# Patient Record
Sex: MALE | Race: WHITE | NOT HISPANIC OR LATINO | Employment: OTHER | URBAN - METROPOLITAN AREA
[De-identification: names, ages, dates, MRNs, and addresses within clinical notes are randomized per-mention and may not be internally consistent; named-entity substitution may affect disease eponyms.]

---

## 2018-05-14 ENCOUNTER — EVALUATION (OUTPATIENT)
Dept: PHYSICAL THERAPY | Facility: CLINIC | Age: 73
End: 2018-05-14
Payer: MEDICARE

## 2018-05-14 DIAGNOSIS — M54.17 RADICULOPATHY, LUMBOSACRAL REGION: Primary | ICD-10-CM

## 2018-05-14 PROCEDURE — G8982 BODY POS GOAL STATUS: HCPCS | Performed by: PHYSICAL THERAPIST

## 2018-05-14 PROCEDURE — 97535 SELF CARE MNGMENT TRAINING: CPT | Performed by: PHYSICAL THERAPIST

## 2018-05-14 PROCEDURE — 97163 PT EVAL HIGH COMPLEX 45 MIN: CPT | Performed by: PHYSICAL THERAPIST

## 2018-05-14 PROCEDURE — G8981 BODY POS CURRENT STATUS: HCPCS | Performed by: PHYSICAL THERAPIST

## 2018-05-14 NOTE — PROGRESS NOTES
PT Evaluation     Today's date: 2018  Patient name: Brent Maurer  : 1945  MRN: [id number]  Referring provider: Maximino Bray MD  Dx:   Encounter Diagnosis     ICD-10-CM    1  Radiculopathy, lumbosacral region M54 17                   Assessment  Impairments: abnormal gait, abnormal or restricted ROM, activity intolerance, impaired balance, impaired physical strength, lacks appropriate home exercise program and pain with function    Assessment details: Pt presents with chief complaint of pain and activity limitation in the upper and lower back with spinal fusions from the mid thoracic to lower lumbar  PT notes the pt demonstrates decreased BLE mm strength and ROM, decreased core strength, decreased L/S ROM, decreased activity tolerance, and decreased functional mobility  Pt would benefit from skilled PT to improve strength, pain free ROM, core stability, and functional mobility  Understanding of Dx/Px/POC: good   Prognosis: good    Goals  STG  1  Pt will decrease pain by 25-50% in 4 wks  2  Pt will improve BLE ROM by 25-50% in 4 wks  3  Pt will demonstrate improved postural control in 4 wks    LTG  1  Pt will decrease pain by % in 8 wks   2  Pt will improve BLE and core mm strength to 5/5 in 8 wks  3   Pt will be able to tolerate walking 0 5-1 0 miles in 8 wks       Plan  Patient would benefit from: skilled PT  Planned modality interventions: cryotherapy, unattended electrical stimulation and thermotherapy: hydrocollator packs  Planned therapy interventions: abdominal trunk stabilization, flexibility, graded exercise, home exercise program, manual therapy, joint mobilization, patient education, postural training, strengthening, stretching and therapeutic exercise  Frequency: 3x week  Duration in visits: 12  Duration in weeks: 4  Treatment plan discussed with: patient  Plan details: Discussed findings of POC with pt, pt agreeable to skilled PT 2-3x/wk for 4 wks        Subjective Evaluation    History of Present Illness  Mechanism of injury: Pt presents with chief complaint of pain in the mid thoracic to cervical region and the lower lumbar and hip region  Pt reports a hx of 2 spinal fusions, the first occurring approximately 40 years ago fusing L2-L4  Most recent fusion performed 17, which took out original fusion ad re-fused from mid thoracic to lower lumbar  Pt reports he has pain that can become very severe above and below the fusion, in the right hip, and into the right calf  Pt recently started pain management to help control pain  Pt notes hx of 4 stents placed 2016 and nocturnal epileptic seizures well controlled by medication  Quality of life: good    Pain  Current pain ratin  At best pain ratin  At worst pain rating: 10  Location: Cervical and Low Back  Quality: dull ache and sharp  Relieving factors: rest and medications  Aggravating factors: walking and standing  Progression: worsening    Treatments  Previous treatment: physical therapy  Patient Goals  Patient goals for therapy: decreased pain, increased motion and increased strength          Objective     Special Questions  Negative for bladder dysfunction, bowel dysfunction and saddle (S4) numbness    Postural Observations  Seated posture: fair  Standing posture: fair        Palpation   Left   Tenderness of the erector spinae and lumbar paraspinals  Right Tenderness of the erector spinae and lumbar paraspinals       Neurological Testing     Reflexes   Left   Patellar (L4): normal (2+)  Achilles (S1): normal (2+)    Right   Patellar (L4): normal (2+)  Achilles (S1): normal (2+)    Active Range of Motion     Lumbar   Flexion: 40 degrees   Extension: 20 degrees   Left lateral flexion: 20 degrees with pain  Right lateral flexion: 15 degrees with pain  Left rotation: 15 degrees with pain  Right rotation: 15 degrees with pain    Strength/Myotome Testing     Left Hip   Planes of Motion   Flexion: 3+  Abduction: 4  Adduction: 4    Right Hip   Planes of Motion   Flexion: 3+  Abduction: 4  Adduction: 4    Left Knee   Flexion: 4+  Extension: 4+    Right Knee   Flexion: 4+  Extension: 4+    Left Ankle/Foot   Dorsiflexion: 4  Plantar flexion: 4    Right Ankle/Foot   Dorsiflexion: 4  Plantar flexion: 4    Additional Strength Details  Pain in low back noted with resisted hip flexion bilaterally     Tests       Thoracic   Negative slump  Lumbar   Positive repeated flexion  Negative repeated extension and slump  Left   Positive passive SLR  Negative quadrant and vertical compression  Right   Positive passive SLR  Negative quadrant and vertical compression  Left Pelvic Girdle/Sacrum   Negative: sacrum compression and gapping  Right Pelvic Girdle/Sacrum   Negative: sacrum compression and gapping  Left Hip   Negative VIVIAN, SI compression, SI distraction and sign of the buttock  Right Hip   Positive VIVIAN  Negative SI compression, SI distraction and sign of the buttock         Flowsheet Rows      Most Recent Value   PT/OT G-Codes   Current Score  32   Projected Score  42   FOTO information reviewed  Yes   Assessment Type  Evaluation   G code set  Changing & Maintaining Body Position   Changing and Maintaining Body Position Current Status ()  CL   Changing and Maintaining Body Position Goal Status ()  CK        Precautions: Back Pain    Specialty Daily Treatment Diary     Manual  5/14/18       BLE PROM                                            Exercise Diary  5/14/18       NuStep        MTP/LTP        TR/HR        Mini Squats        Standing SLR 3-way        Fwd/Lat Step Ups        Supine Hip Abd        Supine Hip Add        Bridges        LTR        HS Stretch        Lat Pulldown        Seated Row        Leg Press        Shoulder Press                                                    Modalities 5/14/18       MHP to Low Back 15 min

## 2018-05-14 NOTE — LETTER
May 30, 2018    Jose Velez MD  [address]  [facility] [place] [address]    Patient: Parisa Avalos   YOB: 1945   Date of Visit: 2018     Encounter Diagnosis     ICD-10-CM    1  Radiculopathy, lumbosacral region M54 17        Dear Dr Florinda Adam:    Please review the attached Plan of Care from Park Castano's recent visit  Please verify that you agree therapy should continue by signing the attached document and sending it back to our office  If you have any questions or concerns, please don't hesitate to call  Sincerely,    Karen Pagan, PT      Referring Provider:      I certify that I have read the below Plan of Care and certify the need for these services furnished under this plan of treatment while under my care  Jose Velez MD  [address]  [facility] [place] [id number]  VIA Facsimile: [phone]          PT Evaluation     Today's date: 2018  Patient name: Parisa Avalos  : 1945  MRN: [id number]  Referring provider: Sunita Berry MD  Dx:   Encounter Diagnosis     ICD-10-CM    1  Radiculopathy, lumbosacral region M54 17                   Assessment  Impairments: abnormal gait, abnormal or restricted ROM, activity intolerance, impaired balance, impaired physical strength, lacks appropriate home exercise program and pain with function    Assessment details: Pt presents with chief complaint of pain and activity limitation in the upper and lower back with spinal fusions from the mid thoracic to lower lumbar  PT notes the pt demonstrates decreased BLE mm strength and ROM, decreased core strength, decreased L/S ROM, decreased activity tolerance, and decreased functional mobility  Pt would benefit from skilled PT to improve strength, pain free ROM, core stability, and functional mobility  Understanding of Dx/Px/POC: good   Prognosis: good    Goals  STG  1  Pt will decrease pain by 25-50% in 4 wks  2   Pt will improve BLE ROM by 25-50% in 4 wks  3  Pt will demonstrate improved postural control in 4 wks    LTG  1  Pt will decrease pain by % in 8 wks   2  Pt will improve BLE and core mm strength to 5/5 in 8 wks  3  Pt will be able to tolerate walking 0 5-1 0 miles in 8 wks       Plan  Patient would benefit from: skilled PT  Planned modality interventions: cryotherapy, unattended electrical stimulation and thermotherapy: hydrocollator packs  Planned therapy interventions: abdominal trunk stabilization, flexibility, graded exercise, home exercise program, manual therapy, joint mobilization, patient education, postural training, strengthening, stretching and therapeutic exercise  Frequency: 3x week  Duration in visits: 12  Duration in weeks: 4  Treatment plan discussed with: patient  Plan details: Discussed findings of POC with pt, pt agreeable to skilled PT 2-3x/wk for 4 wks        Subjective Evaluation    History of Present Illness  Mechanism of injury: Pt presents with chief complaint of pain in the mid thoracic to cervical region and the lower lumbar and hip region  Pt reports a hx of 2 spinal fusions, the first occurring approximately 40 years ago fusing L2-L4  Most recent fusion performed 17, which took out original fusion ad re-fused from mid thoracic to lower lumbar  Pt reports he has pain that can become very severe above and below the fusion, in the right hip, and into the right calf  Pt recently started pain management to help control pain  Pt notes hx of 4 stents placed 2016 and nocturnal epileptic seizures well controlled by medication    Quality of life: good    Pain  Current pain ratin  At best pain ratin  At worst pain rating: 10  Location: Cervical and Low Back  Quality: dull ache and sharp  Relieving factors: rest and medications  Aggravating factors: walking and standing  Progression: worsening    Treatments  Previous treatment: physical therapy  Patient Goals  Patient goals for therapy: decreased pain, increased motion and increased strength          Objective     Special Questions  Negative for bladder dysfunction, bowel dysfunction and saddle (S4) numbness    Postural Observations  Seated posture: fair  Standing posture: fair        Palpation   Left   Tenderness of the erector spinae and lumbar paraspinals  Right Tenderness of the erector spinae and lumbar paraspinals  Neurological Testing     Reflexes   Left   Patellar (L4): normal (2+)  Achilles (S1): normal (2+)    Right   Patellar (L4): normal (2+)  Achilles (S1): normal (2+)    Active Range of Motion     Lumbar   Flexion: 40 degrees   Extension: 20 degrees   Left lateral flexion: 20 degrees with pain  Right lateral flexion: 15 degrees with pain  Left rotation: 15 degrees with pain  Right rotation: 15 degrees with pain    Strength/Myotome Testing     Left Hip   Planes of Motion   Flexion: 3+  Abduction: 4  Adduction: 4    Right Hip   Planes of Motion   Flexion: 3+  Abduction: 4  Adduction: 4    Left Knee   Flexion: 4+  Extension: 4+    Right Knee   Flexion: 4+  Extension: 4+    Left Ankle/Foot   Dorsiflexion: 4  Plantar flexion: 4    Right Ankle/Foot   Dorsiflexion: 4  Plantar flexion: 4    Additional Strength Details  Pain in low back noted with resisted hip flexion bilaterally     Tests       Thoracic   Negative slump  Lumbar   Positive repeated flexion  Negative repeated extension and slump  Left   Positive passive SLR  Negative quadrant and vertical compression  Right   Positive passive SLR  Negative quadrant and vertical compression  Left Pelvic Girdle/Sacrum   Negative: sacrum compression and gapping  Right Pelvic Girdle/Sacrum   Negative: sacrum compression and gapping  Left Hip   Negative VIVIAN, SI compression, SI distraction and sign of the buttock  Right Hip   Positive VIVIAN  Negative SI compression, SI distraction and sign of the buttock         Flowsheet Rows      Most Recent Value   PT/OT G-Codes Current Score  32   Projected Score  42   FOTO information reviewed  Yes   Assessment Type  Evaluation   G code set  Changing & Maintaining Body Position   Changing and Maintaining Body Position Current Status ()  CL   Changing and Maintaining Body Position Goal Status ()  CK        Precautions: Back Pain    Specialty Daily Treatment Diary     Manual  5/14/18       BLE PROM                                            Exercise Diary  5/14/18       NuStep        MTP/LTP        TR/HR        Mini Squats        Standing SLR 3-way        Fwd/Lat Step Ups        Supine Hip Abd        Supine Hip Add        Bridges        LTR        HS Stretch        Lat Pulldown        Seated Row        Leg Press        Shoulder Press                                                    Modalities 5/14/18       MHP to Low Back 15 min

## 2018-05-15 ENCOUNTER — OFFICE VISIT (OUTPATIENT)
Dept: PHYSICAL THERAPY | Facility: CLINIC | Age: 73
End: 2018-05-15
Payer: MEDICARE

## 2018-05-15 DIAGNOSIS — M54.17 RADICULOPATHY, LUMBOSACRAL REGION: Primary | ICD-10-CM

## 2018-05-15 PROCEDURE — 97140 MANUAL THERAPY 1/> REGIONS: CPT

## 2018-05-15 PROCEDURE — 97110 THERAPEUTIC EXERCISES: CPT

## 2018-05-15 NOTE — PROGRESS NOTES
Daily Note     Today's date: 5/15/2018  Patient name: Gillian Chow  : 1945  MRN: [id number]  Referring provider: Janie Garcia MD  Dx: No diagnosis found  Subjective: I am only able to walk about 30 steps 2* pain at back and R leg and because of muscle weakness  Objective: See treatment diary below    Assessment: Tolerated treatment poor  Patient requires freq rests and vc's for correct ex, posture and for lumbar stability  Pt with pain and fatigue with minimal ex  He does like the NuStep  Extreme limitations noted with B calf/HS during ex and MT  Plan: Continue per plan of care     Precautions: Back Pain     Specialty Daily Treatment Diary      Manual  5/14/18  5-15-18         BLE PROM    10'               Exercise Diary  5/14/18  5-15-18         NuStep    L 1  8'         MTP/LTP   Green 1 x 10         TR/HR    1 x 20         Mini Squats    1 x 5         Standing SLR 3-way   Flex only x 5 Aki         Fwd/Lat Step Ups             Supine Hip Abd   Ball x 10         Supine Hip Add   Green x 10         PPT  5" x 10              Bridges             LTR    1 x 10          HS Stretch             Lat Pulldown             Seated Row             Leg Press             Shoulder Press                                                                                         Modalities 5/14/18  5-15-18         MHP to Low Back 15 min  15'

## 2018-05-17 ENCOUNTER — OFFICE VISIT (OUTPATIENT)
Dept: PHYSICAL THERAPY | Facility: CLINIC | Age: 73
End: 2018-05-17
Payer: MEDICARE

## 2018-05-17 DIAGNOSIS — M54.17 RADICULOPATHY, LUMBOSACRAL REGION: Primary | ICD-10-CM

## 2018-05-17 PROCEDURE — 97110 THERAPEUTIC EXERCISES: CPT

## 2018-05-17 PROCEDURE — 97150 GROUP THERAPEUTIC PROCEDURES: CPT

## 2018-05-17 NOTE — PROGRESS NOTES
Daily Note     Today's date: 2018  Patient name: Sangita Moralez  : 1945  MRN: [id number]  Referring provider: Abraham Sue MD  Dx:   Encounter Diagnosis     ICD-10-CM    1  Radiculopathy, lumbosacral region M54 17                   Subjective:  Standing and walking really get the back sore      Objective: See treatment diary below      Assessment: Tolerated treatment well  Tx modified per pt request for more strengthening exercises  Did well with TE  Avoided standing exercises due to reports of increased pain with this  Patient would benefit from continued PT      Plan: Continue per plan of care         Precautions: Back Pain     Specialty Daily Treatment Diary      Manual  5/14/18  5-15-18  5-17-18       BLE PROM    10'               Exercise Diary  5/14/18  5-15-18  5-17-18       NuStep    L 1  8'  L3  10'       MTP/LTP   Green 1 x 10         TR/HR    1 x 20         Mini Squats    1 x 5         Standing SLR 3-way   Flex only x 5 Aki         UBE (retro)     10'         Supine Hip Abd   Ball x 10         Supine Hip Add   Green x 10         PPT  5" x 10 20x  5"     abd crunch   20x      Bridges      30x       LTR    1 x 10          HS Stretch             Lat Pulldown      30#  30x       Seated Row             Leg Press             Shoulder Press                                                                                         Modalities 5/14/18  5-15-18  5-17-18       MHP to Low Back 15 min  15'  10'

## 2018-05-22 ENCOUNTER — APPOINTMENT (OUTPATIENT)
Dept: PHYSICAL THERAPY | Facility: CLINIC | Age: 73
End: 2018-05-22
Payer: MEDICARE

## 2018-05-24 ENCOUNTER — APPOINTMENT (OUTPATIENT)
Dept: PHYSICAL THERAPY | Facility: CLINIC | Age: 73
End: 2018-05-24
Payer: MEDICARE

## 2018-06-04 ENCOUNTER — TRANSCRIBE ORDERS (OUTPATIENT)
Dept: PHYSICAL THERAPY | Facility: CLINIC | Age: 73
End: 2018-06-04

## 2018-06-04 DIAGNOSIS — M54.17 RADICULOPATHY, LUMBOSACRAL REGION: Primary | ICD-10-CM

## 2018-06-21 NOTE — PROGRESS NOTES
Daily Note     Today's date: 2018  Patient name: Quintin Moore  : 1945  MRN: [id number]  Referring provider: Zoë Savage MD  Dx:   Encounter Diagnosis     ICD-10-CM    1  Radiculopathy, lumbosacral region M54 17        Start Time: 5445  Stop Time: 1525  Total time in clinic (min): 70 minutes    Subjective:  Standing and walking really get the back sore      Objective: See treatment diary below      Assessment: Tolerated treatment well  Tx modified per pt request for more strengthening exercises  Did well with TE  Avoided standing exercises due to reports of increased pain with this  Patient would benefit from continued PT      Plan: Continue per plan of care  Precautions: Back Pain     Specialty Daily Treatment Diary      Manual  5/14/18  5-15-18  5-17-18       BLE PROM    10'               Exercise Diary  5/14/18  5-15-18  5-17-18       NuStep    L 1  8'  L3  10'       MTP/LTP   Green 1 x 10         TR/HR    1 x 20         Mini Squats    1 x 5         Standing SLR 3-way   Flex only x 5 Aki         UBE (retro)     10'         Supine Hip Abd   Ball x 10         Supine Hip Add   Green x 10         PPT  5" x 10 20x  5"     abd crunch   20x      Bridges      30x       LTR    1 x 10          HS Stretch             Lat Pulldown      30#  30x       Seated Row             Leg Press             Shoulder Press                                                                                         Modalities 5/14/18  5-15-18  5-17-18       MHP to Low Back 15 min  15'  10'                 Pt was last seen for skilled PT on 18 and did not schedule any additional appointments  Pt has not contacted clinic for additional PT services  Pt will be d/c from skilled PT at this time 2* expiration of PT script

## 2018-09-24 ENCOUNTER — EVALUATION (OUTPATIENT)
Dept: PHYSICAL THERAPY | Facility: CLINIC | Age: 73
End: 2018-09-24
Payer: MEDICARE

## 2018-09-24 DIAGNOSIS — Z98.1 S/P SPINAL FUSION: ICD-10-CM

## 2018-09-24 DIAGNOSIS — M54.50 CHRONIC BILATERAL LOW BACK PAIN WITHOUT SCIATICA: Primary | ICD-10-CM

## 2018-09-24 DIAGNOSIS — G89.29 CHRONIC BILATERAL LOW BACK PAIN WITHOUT SCIATICA: Primary | ICD-10-CM

## 2018-09-24 PROCEDURE — 97140 MANUAL THERAPY 1/> REGIONS: CPT | Performed by: PHYSICAL THERAPIST

## 2018-09-24 PROCEDURE — G8991 OTHER PT/OT GOAL STATUS: HCPCS | Performed by: PHYSICAL THERAPIST

## 2018-09-24 PROCEDURE — 97163 PT EVAL HIGH COMPLEX 45 MIN: CPT | Performed by: PHYSICAL THERAPIST

## 2018-09-24 PROCEDURE — G8990 OTHER PT/OT CURRENT STATUS: HCPCS | Performed by: PHYSICAL THERAPIST

## 2018-09-24 NOTE — PROGRESS NOTES
PT Evaluation     Today's date: 2018  Patient name: Mark Mittal  : 1945  MRN: [id number]  Referring provider: Parminder Andre MD  Dx:   Encounter Diagnosis     ICD-10-CM    1  Chronic bilateral low back pain without sciatica M54 5     G89 29    2  S/P spinal fusion Z98 1                   Assessment  Impairments: abnormal or restricted ROM, activity intolerance, impaired physical strength and pain with function  Functional limitations: Difficulty with walking, kneeling, stair negotiation, picking up low lying objects  Assessment details: Mark Mittal is a 68 y o  male who presents to outpatient PT with a  Chronic bilateral low back pain without sciatica  (primary encounter diagnosis)  S/P spinal fusion  No further referral appears necessary at this time based upon examination results  Pt presents with decreased strength, ROM, balance, functional activity tolerance, and pain with movement in his lumbar spine, which is  limiting his ability to perform the aforementioned functional activities  Etiologic factors include repetitive poor body mechanics  Prognosis is good given HEP compliance and PT 2-3/wk  Please contact me if you have any questions or recommendations  Thank you for the opportunity to share in  [address] [place] [address]  Understanding of Dx/Px/POC: good   Prognosis: good    Goals  1  In 4-6 weeks, patient will demonstrate a decrease in pain to 2/10 during functional activities  2  In 4-6 weeks, patient will demonstrate an increase in range of motion by 10 degrees  3  In 4-6 weeks, patient will demonstrate an increase in strength by 1/2 grade on MMT    1  In 6-8 weeks, patient will be independent with their home exercise program  2  In 6-8 weeks, patient will have zero limitations with strength  3  In 6-8 weeks, patient will have zero limitations with ROM  4  In 6-8 weeks, patient will have zero limitations with ambulation  5    In 6-8 weeks, patient will have zero limitations with stair negotiation    Plan  Patient would benefit from: skilled physical therapy  Planned therapy interventions: manual therapy, therapeutic exercise and home exercise program  Frequency: 3x week  Duration in weeks: 4  Treatment plan discussed with: patient        Subjective Evaluation    History of Present Illness  Date of surgery: 2017  Mechanism of injury: The patient presents to outpatient physical therapy, with a chief complaint of low back pain  He reports that he had a triple fusion in his back, They went in in 2017, and revised the Surgery  He reports that he has 5 fused vertebrae, 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  He is unable to walk for long periods of time, and has trouble negotiating stairs  He is hoping to improve his core strength  Pain  Current pain ratin  At best pain ratin  At worst pain rating: 10  Quality: tight  Relieving factors: change in position  Aggravating factors: walking and stair climbing  Progression: worsening      Diagnostic Tests  X-ray: abnormal  MRI studies: abnormal  Treatments  Previous treatment: physical therapy  Current treatment: physical therapy  Patient Goals  Patient goals for therapy: increased motion, increased strength and decreased pain  Patient goal: Get his life back, Walk a mile, return to biking  Objective     Active Range of Motion     Lumbar   Flexion: 59 degrees   Extension: 10 degrees   Left lateral flexion: 75 degrees   Right lateral flexion: 75 degrees   Left Hip   Flexion: WFL  Abduction: WFL  Adduction: WFL    Right Hip   Flexion: WFL  Abduction: WFL  Adduction: WFL    Additional Active Range of Motion Details  Seated SLR  - Negative Bilaterally       SLR - Negative Bilaterally      90/90 -  Right - 50    Left  - 60    Tightness in Bilateral Piriformis,   Bilateral Hip PROM is WFL             Strength/Myotome Testing     Left Hip   Planes of Motion   Flexion: 3+  Abduction: 3+ and WFL  Adduction: 3+    Right Hip   Planes of Motion   Flexion: 3+  Abduction: 3+  Adduction: 3+          Precautions: Spinal fusion with Titanium implants, avoid Traction       Daily Treatment Diary     Manual  9/24            B HS              B Piriformis                                         15min                Exercise Diary              PPT             Hip Add with Ball             Clamshells              SLR             Supine Marching              Pball ABS              Step Ups              Mini Squat              SLS              HR/TR             Nu Step              T-Band anti rotation              Tband lift chop              Seated Row              Lat pulldown              Leg extension              Leg Curl              Leg Press                                            Modalities

## 2018-09-24 NOTE — LETTER
2018    Randa Willis MD  [address] [facility] [address]    Patient: Mark Mittal   YOB: 1945   Date of Visit: 2018     Encounter Diagnosis     ICD-10-CM    1  Chronic bilateral low back pain without sciatica M54 5     G89 29    2  S/P spinal fusion Z98 1        Dear Dr Wilhelm Phoenix:    Please review the attached Plan of Care from Terrance Castano's recent visit  Please verify that you agree therapy should continue by signing the attached document and sending it back to our office  If you have any questions or concerns, please don't hesitate to call  Sincerely,    Liliam Hamilton PT      Referring Provider:      I certify that I have read the below Plan of Care and certify the need for these services furnished under this plan of treatment while under my care  Randa Willis MD  [address] [facility] [address]: [phone]          PT Evaluation     Today's date: 2018  Patient name: Mark Mittal  : 1945  MRN: [id number]  Referring provider: Parminder Andre MD  Dx:   Encounter Diagnosis     ICD-10-CM    1  Chronic bilateral low back pain without sciatica M54 5     G89 29    2  S/P spinal fusion Z98 1                   Assessment  Impairments: abnormal or restricted ROM, activity intolerance, impaired physical strength and pain with function  Functional limitations: Difficulty with walking, kneeling, stair negotiation, picking up low lying objects  Assessment details: Mark Mittal is a 68 y o  male who presents to outpatient PT with a  Chronic bilateral low back pain without sciatica  (primary encounter diagnosis)  S/P spinal fusion  No further referral appears necessary at this time based upon examination results   Pt presents with decreased strength, ROM, balance, functional activity tolerance, and pain with movement in his lumbar spine, which is  limiting his ability to perform the aforementioned functional activities  Etiologic factors include repetitive poor body mechanics  Prognosis is good given HEP compliance and PT 2-3/wk  Please contact me if you have any questions or recommendations  Thank you for the opportunity to share in  [facility]  Understanding of Dx/Px/POC: good   Prognosis: good    Goals  1  In 4-6 weeks, patient will demonstrate a decrease in pain to 2/10 during functional activities  2  In 4-6 weeks, patient will demonstrate an increase in range of motion by 10 degrees  3  In 4-6 weeks, patient will demonstrate an increase in strength by 1/2 grade on MMT    1  In 6-8 weeks, patient will be independent with their home exercise program  2  In 6-8 weeks, patient will have zero limitations with strength  3  In 6-8 weeks, patient will have zero limitations with ROM  4  In 6-8 weeks, patient will have zero limitations with ambulation  5  In 6-8 weeks, patient will have zero limitations with stair negotiation    Plan  Patient would benefit from: skilled physical therapy  Planned therapy interventions: manual therapy, therapeutic exercise and home exercise program  Frequency: 3x week  Duration in weeks: 4  Treatment plan discussed with: patient        Subjective Evaluation    History of Present Illness  Date of surgery: 2017  Mechanism of injury: The patient presents to outpatient physical therapy, with a chief complaint of low back pain  He reports that he had a triple fusion in his back, They went in in 2017, and revised the Surgery  He reports that he has 5 fused vertebrae, 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  He is unable to walk for long periods of time, and has trouble negotiating stairs  He is hoping to improve his core strength     Pain  Current pain ratin  At best pain ratin  At worst pain rating: 10  Quality: tight  Relieving factors: change in position  Aggravating factors: walking and stair climbing  Progression: worsening      Diagnostic Tests  X-ray: abnormal  MRI studies: abnormal  Treatments  Previous treatment: physical therapy  Current treatment: physical therapy  Patient Goals  Patient goals for therapy: increased motion, increased strength and decreased pain  Patient goal: Get his life back, Walk a mile, return to biking  Objective     Active Range of Motion     Lumbar   Flexion: 59 degrees   Extension: 10 degrees   Left lateral flexion: 75 degrees   Right lateral flexion: 75 degrees   Left Hip   Flexion: WFL  Abduction: WFL  Adduction: WFL    Right Hip   Flexion: WFL  Abduction: WFL  Adduction: WFL    Additional Active Range of Motion Details  Seated SLR  - Negative Bilaterally       SLR - Negative Bilaterally      90/90 -  Right - 50    Left  - 60    Tightness in Bilateral Piriformis,   Bilateral Hip PROM is WFL  Strength/Myotome Testing     Left Hip   Planes of Motion   Flexion: 3+  Abduction: 3+ and WFL  Adduction: 3+    Right Hip   Planes of Motion   Flexion: 3+  Abduction: 3+  Adduction: 3+          Precautions: Spinal fusion with Titanium implants, avoid Traction       Daily Treatment Diary     Manual  9/24            B HS              B Piriformis                                         15min                Exercise Diary              PPT             Hip Add with Ball             Clamshells              SLR             Supine Marching              Pball ABS              Step Ups              Mini Squat              SLS              HR/TR             Nu Step              T-Band anti rotation              Tband lift chop              Seated Row              Lat pulldown              Leg extension              Leg Curl              Leg Press                                            Modalities

## 2018-09-25 ENCOUNTER — TRANSCRIBE ORDERS (OUTPATIENT)
Dept: PHYSICAL THERAPY | Facility: CLINIC | Age: 73
End: 2018-09-25

## 2018-09-25 DIAGNOSIS — M54.5 CHRONIC LOW BACK PAIN, UNSPECIFIED BACK PAIN LATERALITY, WITH SCIATICA PRESENCE UNSPECIFIED: Primary | ICD-10-CM

## 2018-09-25 DIAGNOSIS — G89.29 CHRONIC LOW BACK PAIN, UNSPECIFIED BACK PAIN LATERALITY, WITH SCIATICA PRESENCE UNSPECIFIED: Primary | ICD-10-CM

## 2018-09-26 ENCOUNTER — OFFICE VISIT (OUTPATIENT)
Dept: PHYSICAL THERAPY | Facility: CLINIC | Age: 73
End: 2018-09-26
Payer: MEDICARE

## 2018-09-26 DIAGNOSIS — Z98.1 S/P SPINAL FUSION: ICD-10-CM

## 2018-09-26 DIAGNOSIS — M54.50 CHRONIC BILATERAL LOW BACK PAIN WITHOUT SCIATICA: Primary | ICD-10-CM

## 2018-09-26 DIAGNOSIS — G89.29 CHRONIC BILATERAL LOW BACK PAIN WITHOUT SCIATICA: Primary | ICD-10-CM

## 2018-09-26 PROCEDURE — 97110 THERAPEUTIC EXERCISES: CPT

## 2018-09-26 PROCEDURE — 97140 MANUAL THERAPY 1/> REGIONS: CPT

## 2018-09-26 NOTE — PROGRESS NOTES
Daily Note     Today's date: 2018  Patient name: Merlyn Zamora  : 1945  MRN: [id number]  Referring provider: Steffi Perez MD  Dx: No diagnosis found  Subjective: Pt  Reports he has good and bad days  Objective: See treatment diary below    Precautions: Spinal fusion with Titanium implants, avoid Traction  Daily Treatment Diary     Manual             B HS              B Piriformis                                         15min 15 min               Exercise Diary              PPT 10x10"            Hip Add with Ball 5"x20            Clamshells  Blue 5"x20            SLR             Supine Marching  10x10"            Pball ABS              Step Ups  10x b/l            Mini Squat  10x            SLS  3x15" b/l            HR/TR 10x            Nu Step  L2 10'            T-Band anti rotation              Tband lift chop              Seated Row              Lat pulldown              Leg extension              Leg Curl              Leg Press                                            Modalities                                                           Assessment: Tolerated treatment fair  Patient demonstrated fatigue post treatment, exhibited good technique with therapeutic exercises and would benefit from continued PT  Noted difficulty with prolonged standing activities  Emphasis on L-stabilization t/o therapy session  Tightness B LE musculature  Instructed pt  In self H-S stretch in supine  Pt  Denies pain following RX  Plan: Progress treatment as tolerated

## 2018-10-01 ENCOUNTER — APPOINTMENT (OUTPATIENT)
Dept: PHYSICAL THERAPY | Facility: CLINIC | Age: 73
End: 2018-10-01
Payer: MEDICARE

## 2018-10-04 ENCOUNTER — APPOINTMENT (OUTPATIENT)
Dept: PHYSICAL THERAPY | Facility: CLINIC | Age: 73
End: 2018-10-04
Payer: MEDICARE

## 2018-10-08 ENCOUNTER — OFFICE VISIT (OUTPATIENT)
Dept: PHYSICAL THERAPY | Facility: CLINIC | Age: 73
End: 2018-10-08
Payer: MEDICARE

## 2018-10-08 DIAGNOSIS — G89.29 CHRONIC BILATERAL LOW BACK PAIN WITHOUT SCIATICA: Primary | ICD-10-CM

## 2018-10-08 DIAGNOSIS — M54.50 CHRONIC BILATERAL LOW BACK PAIN WITHOUT SCIATICA: Primary | ICD-10-CM

## 2018-10-08 DIAGNOSIS — Z98.1 S/P SPINAL FUSION: ICD-10-CM

## 2018-10-08 PROCEDURE — 97110 THERAPEUTIC EXERCISES: CPT | Performed by: PHYSICAL THERAPIST

## 2018-10-08 NOTE — PROGRESS NOTES
Daily Note     Today's date: 10/8/2018  Patient name: Anurag Brown  : 1945  MRN: [id number]  Referring provider: Derek Barnhart MD  Dx:   Encounter Diagnosis     ICD-10-CM    1  Chronic bilateral low back pain without sciatica M54 5     G89 29    2  S/P spinal fusion Z98 1                   Subjective: " Im in rough shape today, I have to cancel my appointment on Wednesday because I have a Dr  Appointment "       Objective: See treatment diary below  Manual  9/24 9/26 10/8          B HS              B Piriformis                                         15min 15 min Held               Exercise Diary  9/26  10/8          PPT 10x10"  10''10x          Hip Add with Ball 5"x20  5''x20          Clamshells  Blue 5"x20  Blue 5''x20           SLR             Supine Marching  10x10"  10x10''          Pball ABS              Step Ups  10x b/l            Mini Squat  10x  10x          SLS  3x15" b/l            HR/TR 10x            Nu Step  L2 10'  L3 15'           T-Band anti rotation              Tband lift chop              Seated Row              Lat pulldown              Leg extension              Leg Curl              Leg Press              MTP/LTP    Blue 5''2x10                           Modalities                                                             Assessment: Tolerated treatment well  Patient exhibited good technique with therapeutic exercises and would benefit from continued PT  Poor trunk control during mini squat exercises  Pt needed a rest break during MTP and LTP exercises, stating that his legs felt weak  Continue to progress as tolerated  Plan: Continue per plan of care

## 2018-10-10 ENCOUNTER — APPOINTMENT (OUTPATIENT)
Dept: PHYSICAL THERAPY | Facility: CLINIC | Age: 73
End: 2018-10-10
Payer: MEDICARE

## 2018-10-11 ENCOUNTER — OFFICE VISIT (OUTPATIENT)
Dept: PHYSICAL THERAPY | Facility: CLINIC | Age: 73
End: 2018-10-11
Payer: MEDICARE

## 2018-10-11 DIAGNOSIS — G89.29 CHRONIC BILATERAL LOW BACK PAIN WITHOUT SCIATICA: Primary | ICD-10-CM

## 2018-10-11 DIAGNOSIS — Z98.1 S/P SPINAL FUSION: ICD-10-CM

## 2018-10-11 DIAGNOSIS — M54.50 CHRONIC BILATERAL LOW BACK PAIN WITHOUT SCIATICA: Primary | ICD-10-CM

## 2018-10-11 PROCEDURE — 97110 THERAPEUTIC EXERCISES: CPT

## 2018-10-11 NOTE — PROGRESS NOTES
Daily Note     Today's date: 10/11/2018  Patient name: Will Ho  : 1945  MRN: [id number]  Referring provider: Renuka Hawley MD  Dx:   Encounter Diagnosis     ICD-10-CM    1  Chronic bilateral low back pain without sciatica M54 5     G89 29    2  S/P spinal fusion Z98 1                   Subjective: "I loss my balance carrying in groceries from my truck yesterday and I ended up on my back " "I feel fine but I was stiff this morning " Pt denies going to get checked after the fall and refuses to seek medical attention  "I feel that my COG has changed since this surgery " "My core is so weak, I have trouble even sitting down "      Objective: See treatment diary below  Manual  9/24 9/26 10/8 10/11    B HS         B Piriformis                          15min 15 min Held  Held        Exercise Diary  9/26  10/8 10/11    PPT 10x10"  10''10x 5" x20    Hip Add with Ball 5"x20  5''x20 5" x20    Clamshells  Blue 5"x20  Blue 5''x20  Blue TB 5"x20    SLR        Supine Marching  10x10"  10x10''     Pball ABS         Step Ups  10x b/l       Mini Squat  10x  10x NP    SLS  3x15" b/l       HR/TR 10x       Nu Step  L2 10'  L3 15'  L3 x15'    T-Band anti rotation         Tband lift chop         Seated Row         Lat pulldown         Leg extension         Leg Curl         Leg Press         MTP/LTP    Blue 5''2x10 Blue 5'' 2x10 Pt requests to perform this exercise seated                Modalities                                     Assessment: Tolerated treatment fair  Pt has trouble staying on task and counting reps/hold times  Pt has no bruising or adverse affects from reported fall  Instructed to call PCP if he has pain or bruising in the next few days  Pt verbally agrees  Patient demonstrated fatigue post treatment and would benefit from continued PT      Plan: Continue per plan of care  Progress treatment as tolerated